# Patient Record
Sex: MALE | URBAN - METROPOLITAN AREA
[De-identification: names, ages, dates, MRNs, and addresses within clinical notes are randomized per-mention and may not be internally consistent; named-entity substitution may affect disease eponyms.]

---

## 2024-11-03 ENCOUNTER — EMERGENCY (EMERGENCY)
Facility: HOSPITAL | Age: 24
LOS: 0 days | Discharge: HOME | End: 2024-11-03
Attending: EMERGENCY MEDICINE
Payer: SELF-PAY

## 2024-11-03 VITALS
DIASTOLIC BLOOD PRESSURE: 90 MMHG | OXYGEN SATURATION: 96 % | HEIGHT: 60 IN | SYSTOLIC BLOOD PRESSURE: 145 MMHG | WEIGHT: 150.36 LBS | HEART RATE: 96 BPM | TEMPERATURE: 98 F | RESPIRATION RATE: 20 BRPM
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
## 2024-11-03 DIAGNOSIS — S29.011A STRAIN OF MUSCLE AND TENDON OF FRONT WALL OF THORAX, INITIAL ENCOUNTER: ICD-10-CM

## 2024-11-03 DIAGNOSIS — R07.89 OTHER CHEST PAIN: ICD-10-CM

## 2024-11-03 PROCEDURE — 93005 ELECTROCARDIOGRAM TRACING: CPT

## 2024-11-03 PROCEDURE — 93010 ELECTROCARDIOGRAM REPORT: CPT

## 2024-11-03 PROCEDURE — 71046 X-RAY EXAM CHEST 2 VIEWS: CPT | Mod: 26

## 2024-11-03 PROCEDURE — 93308 TTE F-UP OR LMTD: CPT | Mod: 26

## 2024-11-03 PROCEDURE — 96372 THER/PROPH/DIAG INJ SC/IM: CPT

## 2024-11-03 PROCEDURE — 71046 X-RAY EXAM CHEST 2 VIEWS: CPT

## 2024-11-03 PROCEDURE — 93308 TTE F-UP OR LMTD: CPT

## 2024-11-03 PROCEDURE — 99285 EMERGENCY DEPT VISIT HI MDM: CPT

## 2024-11-03 PROCEDURE — 99284 EMERGENCY DEPT VISIT MOD MDM: CPT | Mod: 25

## 2024-11-03 RX ORDER — KETOROLAC TROMETHAMINE 30 MG/ML
30 INJECTION INTRAMUSCULAR; INTRAVENOUS ONCE
Refills: 0 | Status: DISCONTINUED | OUTPATIENT
Start: 2024-11-03 | End: 2024-11-03

## 2024-11-03 RX ADMIN — KETOROLAC TROMETHAMINE 30 MILLIGRAM(S): 30 INJECTION INTRAMUSCULAR; INTRAVENOUS at 13:32

## 2024-11-03 RX ADMIN — KETOROLAC TROMETHAMINE 30 MILLIGRAM(S): 30 INJECTION INTRAMUSCULAR; INTRAVENOUS at 13:47

## 2024-11-03 NOTE — ED PROVIDER NOTE - PROGRESS NOTE DETAILS
[facility]  >[facility] without acute cardiopulmonary process.  >EKG without ischemic findings, ST elvation/depression, and morphology suggestive of congenital cardiac disease.  >TTE without evidence of pleural effusion. good ef. no wall motion abnormality.  >Plan to d/c patient with discharge instructions, return precautions, and PCP follow-up. LISETH HERNANDEZ:  >Patient evaluated and found NAD, AAOx3, VSS WNL, playing on phone in chair.  >Hx: Sharp chest pain that started while playing soccer with friends yesterday. Comes and goes. Worse with movement of L. arm and laying on left side. Denies trauma to area.   >No PMHx cardiac events or FMHx of heart disease. Patient previously smoker, 2 cigarettes a day 3 years.   >Plan: TTE, EKG, CXR.

## 2024-11-03 NOTE — ED PROVIDER NOTE - EKG/XRAY ADDITIONAL INFORMATION
independent interpretation of the ekg performed by Dr. Ty Bell shows Normal sinus rhythm heart rate 87  QRS 84 QTc 450 right axis deviation no elevations or depressions

## 2024-11-03 NOTE — ED PROVIDER NOTE - CLINICAL SUMMARY MEDICAL DECISION MAKING FREE TEXT BOX
Left chest wall pain x-ray without any pneumothorax or fracture likely musculoskeletal discussed results with the patient indications return to the ED were discussed explained

## 2024-11-03 NOTE — ED PROVIDER NOTE - PATIENT PORTAL LINK FT
0 You can access the FollowMyHealth Patient Portal offered by [facility] by registering at the following website: [email or web address]. By joining [facility]’s FollowMyHealth portal, you will also be able to view your health information using other applications (apps) compatible with our system.

## 2024-11-03 NOTE — ED PROVIDER NOTE - NSFOLLOWUPINSTRUCTIONS_ED_ALL_ED_FT
Tam, aquí tienes la traducción sin formato:    Nuestros Coordinadores de Referencias del Departamento de Emergencias se comunicarán contigo en las próximas 24-48 horas, de 9:00 a.m. a 5:00 p.m. (de lunes a viernes) para programar morgan kajal de seguimiento. Por favor, espera morgan llamada telefónica del hospital en augustin lapso de tiempo. Si no recibes morgan llamada o si tienes alguna pregunta o inquietud, puedes comunicarte con nancyos al [phone].     El dolor musculoesquelético se refiere a los kwadwo y molestias en músculos y huesos. Estos kwadwo pueden ocurrir en cualquier parte del cuerpo. Tu cuidador puede tratarte sin conocer la causa del dolor. Pueden tratarte si los análisis de willian o de orina, radiografías y otras pruebas fueron normales.     A menudo no hay morgan causa o razón definida para estos kwadwo. Estos kwadwo pueden ser causados por un tipo de germen (virus). La incomodidad también puede ser resultado del uso excesivo. El uso excesivo incluye hacer ejercicio en exceso cuando tu cuerpo no está en forma. Los kwadwo en los huesos también provienen de cambios climáticos. El hueso es sensible a los cambios de presión atmosférica.     Pregunta cuándo estarán listos los resultados de tus pruebas. Asegúrate de recibir tus resultados. Solo kristian medicamentos de venta fatoumata o recetados para el dolor, incomodidad o fiebre según lo indicado por tu cuidador. Si te dieron medicamentos para tu condición, no conduzcas, operes maquinaria o herramientas eléctricas, ni firmes documentos legales matteo 24 horas. No consumas alcohol. No tomes pastillas para dormir ni otros medicamentos que puedan interferir con el tratamiento. Continúa con todas las actividades a menos que estas causen más dolor. Cuando el dolor disminuya, retoma lentamente las actividades normales. Aumenta gradualmente la intensidad y duración de las actividades o el ejercicio. Matteo períodos de dolor intenso, el reposo en la cama puede ser útil. Acuéstate o siéntate en cualquier posición que te resulte cómoda. Aplica hielo en el área lesionada. Coloca hielo en morgan bolsa. Zach morgan toalla entre tu piel y la bolsa. Odalys el hielo por 15 a 20 minutos, 3 a 4 veces al día. Consulta a tu cuidador si persisten problemas y no se encuentra razón para el dolor. Si el dolor empeora o no desaparece, puede ser necesario repetir pruebas o realizar pruebas adicionales. Tu cuidador puede necesitar investigar más para encontrar morgan posible causa.     Busca atención médica inmediata si tienes dolor que está empeorando y no se tatiana con medicamentos. Desarrollas dolor en el pecho asociado con falta de aire, sudoración, sensación de náuseas o vómitos. Tu dolor se localiza en el abdomen. Desarrollas nuevos síntomas que parecen diferentes o que te preocupan.     Asegúrate de entender estas instrucciones. Vigilar tu condición. Buscar ayuda de inmediato si no te sientes samara o empeoras.     Por favor, sigue con tu médico de cabecera en 24-48 horas. Busca atención médica inmediata para cualquier signo o síntoma nuevo o en empeoramiento.     Si necesitas algo más, avísame.

## 2024-11-03 NOTE — ED PROVIDER NOTE - PHYSICAL EXAMINATION
CONSTITUTIONAL: well-appearing, in NAD, sitting comfortably in stretcher.   SKIN: Warm dry, normal skin turgor  HEAD: NCAT  EYES: EOMI, PERRLA, no scleral icterus, conjunctiva pink  ENT: normal pharynx with no erythema or exudates  NECK: Supple; non tender. Full ROM.  CHEST: TTP of L. lateral chest into axillary region. Worsening pain with movement of LUE.   CARD: RRR, no murmurs.  RESP: clear to ausculation b/l. No crackles or wheezing.  ABD: soft, non-tender, non-distended, no rebound or guarding.  EXT: Full ROM, no bony tenderness, no pedal edema, no calf tenderness  NEURO: normal motor. CN II-XII intact. Normal gait.  PSYCH: Cooperative, appropriate.

## 2024-11-03 NOTE — ED PROVIDER NOTE - ATTENDING CONTRIBUTION TO CARE
chest pain left sided pleuritic in nature while playing soccer. no vomiting, no sob. no hemoptysis, exam shows skin is nml, tender to touch, lungs cta, plan is xray and ekg

## 2024-11-03 NOTE — ED PROVIDER NOTE - OBJECTIVE STATEMENT
Case of 24 year-old, male patient with no PMHx who comes to the ED for L. chest pain that started while playing soccer yesterday. Patient refers that he was playing soccer with his friends and developed some sharp chest pain, worse with movement of his right arm, and when laying on his side. Denies any trauma to the area. Patient denies any FMHx of cardiac events. Otherwise denies fevers, headache, chills, sob, n/v/d, abd pain, back pain, changes in urinary/stool habitus, calf tenderness or swelling, recent travel, and sick contacts.

## 2024-11-03 NOTE — ED PROVIDER NOTE - NSPTACCESSSVCSAPPTDETAILS_ED_ALL_ED_FT
Turkish speaking male patient. Does not have PCP. Please connect to primary care so that patient may establish care with provider.

## 2024-11-04 NOTE — CHART NOTE - NSCHARTNOTEFT_GEN_A_CORE
prefers mornings Mondays - Thursdays, sent to [facility] chat, appt scheduled on 12/2 @ 8AM @ [address] with Dr Garcia 11/4 - TYRELL (pcp referral)

## 2024-12-02 ENCOUNTER — APPOINTMENT (OUTPATIENT)
Dept: INTERNAL MEDICINE | Facility: CLINIC | Age: 24
End: 2024-12-02
Payer: COMMERCIAL

## 2024-12-02 ENCOUNTER — OUTPATIENT (OUTPATIENT)
Dept: OUTPATIENT SERVICES | Facility: HOSPITAL | Age: 24
LOS: 1 days | End: 2024-12-02
Payer: COMMERCIAL

## 2024-12-02 VITALS
DIASTOLIC BLOOD PRESSURE: 87 MMHG | TEMPERATURE: 97.5 F | SYSTOLIC BLOOD PRESSURE: 136 MMHG | BODY MASS INDEX: 26.4 KG/M2 | HEIGHT: 63 IN | OXYGEN SATURATION: 98 % | HEART RATE: 62 BPM | WEIGHT: 149 LBS

## 2024-12-02 DIAGNOSIS — Z00.00 ENCOUNTER FOR GENERAL ADULT MEDICAL EXAMINATION WITHOUT ABNORMAL FINDINGS: ICD-10-CM

## 2024-12-02 DIAGNOSIS — Z23 ENCOUNTER FOR IMMUNIZATION: ICD-10-CM

## 2024-12-02 DIAGNOSIS — F17.200 NICOTINE DEPENDENCE, UNSPECIFIED, UNCOMPLICATED: ICD-10-CM

## 2024-12-02 DIAGNOSIS — Z78.9 OTHER SPECIFIED HEALTH STATUS: ICD-10-CM

## 2024-12-02 DIAGNOSIS — Z00.00 ENCOUNTER FOR GENERAL ADULT MEDICAL EXAMINATION W/OUT ABNORMAL FINDINGS: ICD-10-CM

## 2024-12-02 LAB
25(OH)D3 SERPL-MCNC: 8 NG/ML
ALBUMIN SERPL ELPH-MCNC: 5 G/DL
ALP BLD-CCNC: 102 U/L
ALT SERPL-CCNC: 54 U/L
ANION GAP SERPL CALC-SCNC: 15 MMOL/L
AST SERPL-CCNC: 43 U/L
BILIRUB SERPL-MCNC: 0.6 MG/DL
BUN SERPL-MCNC: 12 MG/DL
CALCIUM SERPL-MCNC: 10.4 MG/DL
CHLORIDE SERPL-SCNC: 100 MMOL/L
CHOLEST SERPL-MCNC: 189 MG/DL
CO2 SERPL-SCNC: 23 MMOL/L
CREAT SERPL-MCNC: 0.8 MG/DL
EGFR: 127 ML/MIN/1.73M2
ESTIMATED AVERAGE GLUCOSE: 117 MG/DL
GLUCOSE SERPL-MCNC: 108 MG/DL
HBA1C MFR BLD HPLC: 5.7 %
HCT VFR BLD CALC: 50.1 %
HDLC SERPL-MCNC: 33 MG/DL
HGB BLD-MCNC: 17.5 G/DL
LDLC SERPL CALC-MCNC: 113 MG/DL
MCHC RBC-ENTMCNC: 29.9 PG
MCHC RBC-ENTMCNC: 34.9 G/DL
MCV RBC AUTO: 85.6 FL
NONHDLC SERPL-MCNC: 156 MG/DL
PLATELET # BLD AUTO: 255 K/UL
PMV BLD AUTO: 0 /100 WBCS
PMV BLD: 10.9 FL
POTASSIUM SERPL-SCNC: 3.9 MMOL/L
PROT SERPL-MCNC: 8.1 G/DL
RBC # BLD: 5.85 M/UL
RBC # FLD: 12.3 %
SODIUM SERPL-SCNC: 138 MMOL/L
TRIGL SERPL-MCNC: 214 MG/DL
TSH SERPL-ACNC: 2.2 UIU/ML
WBC # FLD AUTO: 6.35 K/UL

## 2024-12-02 PROCEDURE — 83036 HEMOGLOBIN GLYCOSYLATED A1C: CPT

## 2024-12-02 PROCEDURE — 84443 ASSAY THYROID STIM HORMONE: CPT

## 2024-12-02 PROCEDURE — 80053 COMPREHEN METABOLIC PANEL: CPT

## 2024-12-02 PROCEDURE — 85027 COMPLETE CBC AUTOMATED: CPT

## 2024-12-02 PROCEDURE — 99213 OFFICE O/P EST LOW 20 MIN: CPT

## 2024-12-02 PROCEDURE — 82306 VITAMIN D 25 HYDROXY: CPT

## 2024-12-02 PROCEDURE — 99203 OFFICE O/P NEW LOW 30 MIN: CPT

## 2024-12-02 PROCEDURE — 80061 LIPID PANEL: CPT

## 2024-12-02 PROCEDURE — 90471 IMMUNIZATION ADMIN: CPT | Mod: 25

## 2024-12-03 DIAGNOSIS — Z00.00 ENCOUNTER FOR GENERAL ADULT MEDICAL EXAMINATION WITHOUT ABNORMAL FINDINGS: ICD-10-CM

## 2024-12-03 DIAGNOSIS — Z78.9 OTHER SPECIFIED HEALTH STATUS: ICD-10-CM

## 2024-12-03 DIAGNOSIS — F17.200 NICOTINE DEPENDENCE, UNSPECIFIED, UNCOMPLICATED: ICD-10-CM

## 2024-12-03 DIAGNOSIS — Z23 ENCOUNTER FOR IMMUNIZATION: ICD-10-CM

## 2024-12-06 ENCOUNTER — APPOINTMENT (OUTPATIENT)
Dept: INTERNAL MEDICINE | Facility: CLINIC | Age: 24
End: 2024-12-06